# Patient Record
Sex: FEMALE | Race: WHITE | Employment: OTHER | URBAN - NONMETROPOLITAN AREA
[De-identification: names, ages, dates, MRNs, and addresses within clinical notes are randomized per-mention and may not be internally consistent; named-entity substitution may affect disease eponyms.]

---

## 2021-09-30 ENCOUNTER — HOSPITAL ENCOUNTER (OUTPATIENT)
Age: 36
Discharge: HOME | End: 2021-09-30
Payer: MEDICAID

## 2021-09-30 LAB
ALBUMIN SERPL-MCNC: 4.3 GM/DL (ref 3.4–5)
ALP BLD-CCNC: 48 IU/L (ref 40–129)
ALT SERPL-CCNC: 9 U/L (ref 10–40)
AMYLASE: 36 U/L (ref 25–115)
ANION GAP SERPL CALCULATED.3IONS-SCNC: 12 MMOL/L (ref 4–16)
AST SERPL-CCNC: 17 IU/L (ref 15–37)
BASOPHILS ABSOLUTE: 0.1 K/CU MM
BASOPHILS RELATIVE PERCENT: 0.6 % (ref 0–1)
BILIRUB SERPL-MCNC: 0.3 MG/DL (ref 0–1)
BUN BLDV-MCNC: 10 MG/DL (ref 6–23)
CALCIUM SERPL-MCNC: 9.2 MG/DL (ref 8.3–10.6)
CHLORIDE BLD-SCNC: 103 MMOL/L (ref 99–110)
CO2: 24 MMOL/L (ref 21–32)
CREAT SERPL-MCNC: 1 MG/DL (ref 0.6–1.1)
DIFFERENTIAL TYPE: ABNORMAL
EOSINOPHILS ABSOLUTE: 0.4 K/CU MM
EOSINOPHILS RELATIVE PERCENT: 3.2 % (ref 0–3)
GFR AFRICAN AMERICAN: >60 ML/MIN/1.73M2
GFR NON-AFRICAN AMERICAN: >60 ML/MIN/1.73M2
GLUCOSE BLD-MCNC: 86 MG/DL (ref 70–99)
HCT VFR BLD CALC: 38.3 % (ref 37–47)
HEMOGLOBIN: 12.6 GM/DL (ref 12.5–16)
IMMATURE NEUTROPHIL %: 0.4 % (ref 0–0.43)
LIPASE: 46 IU/L (ref 13–60)
LYMPHOCYTES ABSOLUTE: 3.6 K/CU MM
LYMPHOCYTES RELATIVE PERCENT: 26.5 % (ref 24–44)
MCH RBC QN AUTO: 29.4 PG (ref 27–31)
MCHC RBC AUTO-ENTMCNC: 32.9 % (ref 32–36)
MCV RBC AUTO: 89.3 FL (ref 78–100)
MONOCYTES ABSOLUTE: 0.9 K/CU MM
MONOCYTES RELATIVE PERCENT: 6.8 % (ref 0–4)
PDW BLD-RTO: 12.8 % (ref 11.7–14.9)
PLATELET # BLD: 311 K/CU MM (ref 140–440)
PMV BLD AUTO: 10.5 FL (ref 7.5–11.1)
POTASSIUM SERPL-SCNC: 3.9 MMOL/L (ref 3.5–5.1)
RBC # BLD: 4.29 M/CU MM (ref 4.2–5.4)
SEGMENTED NEUTROPHILS ABSOLUTE COUNT: 8.4 K/CU MM
SEGMENTED NEUTROPHILS RELATIVE PERCENT: 62.5 % (ref 36–66)
SODIUM BLD-SCNC: 139 MMOL/L (ref 135–145)
TOTAL IMMATURE NEUTOROPHIL: 0.05 K/CU MM
TOTAL PROTEIN: 7.3 GM/DL (ref 6.4–8.2)
WBC # BLD: 13.5 K/CU MM (ref 4–10.5)

## 2021-09-30 PROCEDURE — 85025 COMPLETE CBC W/AUTO DIFF WBC: CPT

## 2021-09-30 PROCEDURE — 83690 ASSAY OF LIPASE: CPT

## 2021-09-30 PROCEDURE — 80053 COMPREHEN METABOLIC PANEL: CPT

## 2021-09-30 PROCEDURE — 82150 ASSAY OF AMYLASE: CPT

## 2021-09-30 PROCEDURE — 36415 COLL VENOUS BLD VENIPUNCTURE: CPT

## 2021-11-16 ENCOUNTER — HOSPITAL ENCOUNTER (OUTPATIENT)
Dept: SLEEP CENTER | Age: 36
Discharge: HOME | End: 2021-11-16
Payer: MEDICAID

## 2021-11-16 VITALS
WEIGHT: 198 LBS | SYSTOLIC BLOOD PRESSURE: 131 MMHG | DIASTOLIC BLOOD PRESSURE: 84 MMHG | BODY MASS INDEX: 31.08 KG/M2 | HEIGHT: 67 IN

## 2021-11-16 PROCEDURE — 99211 OFF/OP EST MAY X REQ PHY/QHP: CPT

## 2021-11-16 RX ORDER — MONTELUKAST SODIUM 10 MG/1
TABLET ORAL
COMMUNITY
Start: 2021-11-07

## 2021-11-16 RX ORDER — PROPRANOLOL HYDROCHLORIDE 160 MG/1
CAPSULE, EXTENDED RELEASE ORAL
COMMUNITY
Start: 2021-11-13

## 2021-11-16 RX ORDER — ONDANSETRON 4 MG/1
TABLET, FILM COATED ORAL
COMMUNITY
Start: 2021-11-07

## 2021-11-16 ASSESSMENT — SLEEP AND FATIGUE QUESTIONNAIRES
HOW LIKELY ARE YOU TO NOD OFF OR FALL ASLEEP WHILE SITTING AND TALKING TO SOMEONE: 2
HOW LIKELY ARE YOU TO NOD OFF OR FALL ASLEEP WHILE SITTING QUIETLY AFTER LUNCH WITHOUT ALCOHOL: 2
ESS TOTAL SCORE: 17
HOW LIKELY ARE YOU TO NOD OFF OR FALL ASLEEP WHEN YOU ARE A PASSENGER IN A CAR FOR AN HOUR WITHOUT A BREAK: 3
HOW LIKELY ARE YOU TO NOD OFF OR FALL ASLEEP WHILE SITTING INACTIVE IN A PUBLIC PLACE: 2
HOW LIKELY ARE YOU TO NOD OFF OR FALL ASLEEP WHILE WATCHING TV: 2
HOW LIKELY ARE YOU TO NOD OFF OR FALL ASLEEP WHILE SITTING AND READING: 3
HOW LIKELY ARE YOU TO NOD OFF OR FALL ASLEEP IN A CAR, WHILE STOPPED FOR A FEW MINUTES IN TRAFFIC: 0
HOW LIKELY ARE YOU TO NOD OFF OR FALL ASLEEP WHILE LYING DOWN TO REST IN THE AFTERNOON WHEN CIRCUMSTANCES PERMIT: 3

## 2021-11-16 NOTE — PROGRESS NOTES
Leandra Upton MD, Rometta Lombard MD, Andrea Kim MD, Ken Dennison MD, [facility]      [address] [facility]. 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 female, referred to the sleep center for narcolepsy for six years. She was taking modafinil . She stopped taking modafinil but then stopped because of side effects. she had sleep study done 4-5 years ago which was negative. now she has difficulty sleeping during the night and can not stay awake during the day. Referring physician:  Timothy Galvan    History:    Social History     Socioeconomic History    Marital status: Single     Spouse name: Not on file    Number of children: Not on file    Years of education: Not on file    Highest education level: Not on file   Occupational History    Not on file   Tobacco Use    Smoking status: Not on file    Smokeless tobacco: Not on file   Substance and Sexual Activity    Alcohol use: Not on file    Drug use: Not on file    Sexual activity: Not on file   Other Topics Concern    Not on file   Social History Narrative    Not on file     Social Determinants of Health     Financial Resource Strain:     Difficulty of Paying Living Expenses: Not on file   Food Insecurity:     Worried About Running Out of Food in the Last Year: Not on file    [place] of Food in the Last Year: Not on file   Transportation Needs:     Lack of Transportation (Medical): Not on file    Lack of Transportation (Non-Medical):  Not on file   Physical Activity:     Days of Exercise per Week: Not on file    Minutes of Exercise per Session: Not on file   Stress:     Feeling of Stress : Not on file   Social Connections:     Frequency of Communication with Friends and Family: Not on file    Frequency of Social Gatherings with Friends and Family: Not on file    Attends [employer] Services: Not on file    Active Member of Clubs or Organizations: Not on file    Attends Club or Organization Meetings: Not on file    Marital Status: Not on file   Intimate Partner Violence:     Fear of Current or Ex-Partner: Not on file    Emotionally Abused: Not on file    Physically Abused: Not on file    Sexually Abused: Not on file   Housing Stability:     Unable to Pay for Housing in the Last Year: Not on file    Number of [place] in the Last Year: Not on file    Unstable Housing in the Last Year: Not on file       Prior to Admission medications    Not on File       Allergies as of 11/16/2021    (Not on File)       Objective:   Heart Rate: 72bpm  SPO2: 99%  Blood Pressure: 131/84  Height: 67\"  Weight: 198 lbs  Neck: 14.5 Inches  Powell - 17    Gen: No distress. Eyes: PERRL. No sclera icterus. No conjunctival injection. ENT: No discharge. Pharynx clear. External appearance of ears and nose normal.  Neck: Trachea midline. No obvious mass. Resp: No accessory muscle use. No crackles. No wheezes. No rhonchi. No dullness on percussion. CV: Regular rate. Regular rhythm. No murmur or rub. No edema. GI: Non-tender. Non-distended. No hernia. Skin: Warm, dry, normal texture and turgor. No nodule on exposed extremities. Lymph: No cervical LAD. No supraclavicular LAD. M/S: No cyanosis. No clubbing. No joint deformity. Psych: Oriented x 3. No anxiety. Awake. Alert. Intact judgement and insight.     Mallampati Airway Classification:   []1 [x]2 []3 []4        Sleep Complaints/Symptoms:    Normal Bedtime: 10:30PM     Normal Wake Time: 11:30AM  Average Sleep Time: 11hours     Number of Awakenings: 2-3    Duration of Sleep Complaints: 10 years    [] Snoring     [] Improved [] Not Improved    [] Choking/Gasping for Breath  [] Improved [] Not Improved       [] Witnessed Apneas              [] Improved [] Not Improved  [x] Daytime Sleepiness             [] Improved [] Not Improved  [] Morning Headaches    [] Improved [] Not Improved  [] Frequent Awakenings       [] Improved [] Not Improved  [] Jerky Movements   [] Improved [] Not Improved   [] Restless Legs   [] Improved [] Not Improved   [] Difficulty Initiating Sleep  [] Improved [] Not Improved   [] Difficulty Maintaining Sleep  [] Improved [] Not Improved   [] Restless Sleep    [] Improved [] Not Improved   [] Sleep Paralysis    [] Improved [] Not Improved   [] Muscle Weakness w/ Emotion  [] Improved [] Not Improved  [] Other :     CPAP Usage:    [x]  Patient has never worn CPAP  []  Patient has worn CPAP previously but discontinued use  []  Current PAP user,  [years]   []  Patient Tolerates Well   []  Patient Does Not Tolerate     []  Patient Uses CPAP      []  More Than 4 Hours      []  Less Than 4 Hours  []  CPAP/BPAP/ASV Pressure Readings   []  CPAP Pressure      cm H20   []  BPAP Pressure       cm H20   []  ASV Pressure         cm H20      Assessment:      Diagnosis:  narcolepsy       Plan:        Sleep Study:     []  Sleep hygiene/ relaxation methods & CBTi principles review with patient     []  HST - Home Sleep Study   []  PSG - Overnight Diagnostic Polysomnogram     []  CPAP Titration    [] Split Night Study    [] BiLevel Titration    [] ASV - Auto-Servo Ventilation Titration       []  MSLT - Multiple Sleep Latency Test   []  MWT - Maintenance of Wakefulness Test    CPAP Therapy:     []  Patient to be seen for new mask fitting/desensitization   []  AutoPAP Titration    []  CPAP supplies and equipment at ________cmH2O    []  Continue same CPAP pressure   []  Change CPAP pressure to _______cm H2O   []  CPAP supplies only, no pressure change   []  Refer for an oral appliance       Medications:       [x]  Continue current medication    []  Add Medication:  ________________    Follow-Up:     []  No follow up required. Patient to return as needed.     []  2 weeks   []  4 weeks   []  2 months   []  4 months   [] 6 months   []  1 year for CPAP compliance evaluation. Patient to return sooner, as needed. []  Follow up after sleep study   [x]  Other: _I will send her to a neurologist as has narcolepsy and having problem with modafinil_    No orders of the defined types were placed in this encounter.          Electronically signed by Danielle Reveles MD on 11/16/2021 at 11:54 AM

## 2022-08-17 ENCOUNTER — HOSPITAL ENCOUNTER (EMERGENCY)
Age: 37
Discharge: HOME | End: 2022-08-17
Attending: STUDENT IN AN ORGANIZED HEALTH CARE EDUCATION/TRAINING PROGRAM
Payer: MEDICAID

## 2022-08-17 VITALS
HEART RATE: 67 BPM | OXYGEN SATURATION: 97 % | RESPIRATION RATE: 18 BRPM | SYSTOLIC BLOOD PRESSURE: 138 MMHG | TEMPERATURE: 98.4 F | DIASTOLIC BLOOD PRESSURE: 95 MMHG

## 2022-08-17 DIAGNOSIS — H54.7 VISION LOSS: Primary | ICD-10-CM

## 2022-08-17 LAB — HCG(URINE) PREGNANCY TEST: NEGATIVE

## 2022-08-17 PROCEDURE — 2580000003 HC RX 258: Performed by: STUDENT IN AN ORGANIZED HEALTH CARE EDUCATION/TRAINING PROGRAM

## 2022-08-17 PROCEDURE — 99285 EMERGENCY DEPT VISIT HI MDM: CPT

## 2022-08-17 PROCEDURE — 84703 CHORIONIC GONADOTROPIN ASSAY: CPT

## 2022-08-17 PROCEDURE — 96374 THER/PROPH/DIAG INJ IV PUSH: CPT

## 2022-08-17 PROCEDURE — 6360000002 HC RX W HCPCS: Performed by: STUDENT IN AN ORGANIZED HEALTH CARE EDUCATION/TRAINING PROGRAM

## 2022-08-17 RX ORDER — PROCHLORPERAZINE EDISYLATE 5 MG/ML
10 INJECTION INTRAMUSCULAR; INTRAVENOUS ONCE
Status: COMPLETED | OUTPATIENT
Start: 2022-08-17 | End: 2022-08-17

## 2022-08-17 RX ORDER — 0.9 % SODIUM CHLORIDE 0.9 %
1000 INTRAVENOUS SOLUTION INTRAVENOUS ONCE
Status: COMPLETED | OUTPATIENT
Start: 2022-08-17 | End: 2022-08-17

## 2022-08-17 RX ADMIN — SODIUM CHLORIDE 1000 ML: 0.9 INJECTION, SOLUTION INTRAVENOUS at 17:46

## 2022-08-17 RX ADMIN — PROCHLORPERAZINE EDISYLATE 10 MG: 5 INJECTION, SOLUTION INTRAMUSCULAR; INTRAVENOUS at 17:45

## 2022-08-17 ASSESSMENT — PAIN - FUNCTIONAL ASSESSMENT: PAIN_FUNCTIONAL_ASSESSMENT: NONE - DENIES PAIN

## 2022-08-17 NOTE — ED PROVIDER NOTES
melena, hematochezia    -Genitourinary: hematuria, dysuria, urinary frequency    -Neurological: numbness or weakness    -Skin:   Rash, pruritis,     -Hematologic: easy bleeding, easy bruising    -Musculoskeletal:  joint swelling, joint redness    Past Medical, Surgical, Family, and Social History     She has a past medical history of Asthma, Cortical visual impairment, Dysautonomia ([facility] [address] EDS (Chandu-Danlos syndrome), Gastroparesis, Headache, Mast cell disorder, and TBI (traumatic brain injury) ([facility] [address] She has a past surgical history that includes Tonsillectomy. Her family history is not on file. She reports current alcohol use. She reports that she does not currently use drugs. Medications     Previous Medications    MONTELUKAST (SINGULAIR) 10 MG TABLET        ONDANSETRON (ZOFRAN) 4 MG TABLET        PROPRANOLOL (INDERAL LA) 160 MG EXTENDED RELEASE CAPSULE           Allergies     She is allergic to buspar [buspirone], dilaudid [hydromorphone], neosporin [bacitracin-polymyxin b], and sulfa antibiotics. Physical Exam     INITIAL VITALS: BP: (!) 150/92, Temp: 98.4 °F (36.9 °C), Heart Rate: 57, Resp: 17,       General:  Well appearing. No acute distress. Non-toxic appearing    Eyes:  Pupils equally round, reactive, brisk. No discharge from eyes. No papilledema b/l  ENT:  No discharge from nose. OP clear. Neck:  Supple. Nontender. Pulmonary:   Non-labored breathing. Breath sounds clear bilaterally. Cardiac:  Regular rate and rhythm. No murmurs. Abdomen:  Soft. Non-tender. Non-distended. No masses. Musculoskeletal:  No long bone deformity. No ankle or wrist deformity. Vascular:  Extremities warm and perfused. Skin:  No rash. Warm. Neuro:  Mental Status: GCS 15. AAOx4. Speech is clear and fluent, with no aphasia or dysarthria. CN: Visual fields are full to confrontation. Pupils are 4->2 mm bilaterally, round and briskly reactive to light. EOMI with no nystagmus.  Facial sensation is intact in all 3 divisions bilaterally. Face is symmetric with normal eye closure and smile. Hearing is grossly intact bilaterally. Palate elevates symmetrically. Phonation is normal. Shoulder shrug is intact bilaterally. Tongue is midline with normal movement and no atrophy. Motor:   Shoulder Abduction Arm Flexion Arm Extension Wrist Extension Finger Abduction    Left 5 5 5 5 5 5   Right 5 5 5 5 5 5    Hip Flexion Hip Extension Knee Flexion Knee Extension Plantar Flexion Dorsiflexion   Left 5 5 5 5 5 5   Right 5 5 5 5 5 5   Sensation: Intact to fine touch in bilateral upper and lower extremities. Reflexes: 2+ and symmetric in bilateral  knees. Coordination: Finger-to-nose normal bilaterally. Gait/Balance/Proprioception: Gait normal without ataxia. Normal Romberg test. No pronator drift. Extremities:  No peripheral edema. LE symmetric. Diagnostic Results     EKG   None indicated    RADIOLOGY:  No orders to display       LABS:   Results for orders placed or performed during the hospital encounter of 08/17/22   hCG, qualitative, pregnancy (Lab)   Result Value Ref Range    HCG(Urine) Pregnancy Test Negative Detects HCG level >20 MIU/mL       ED BEDSIDE ULTRASOUND:  None performed    Procedures     None performed    ED Course     Nursing Notes, Past Medical Hx, Past Surgical Hx, Social Hx, Allergies, and Family Hx were reviewed. The patient was given the following medications:  Orders Placed This Encounter   Medications    prochlorperazine (COMPAZINE) injection 10 mg    0.9 % sodium chloride bolus       CONSULTS:  None    MEDICAL DECISIONMAKING / ASSESSMENT / Annettesymone Jewell is a 40 y.o. female with vision loss. Pt was hemodynamically stable and afebrile in the Emergency Department. Patient presents with visual symptoms. The differential would include retinal detachment. Given her history of optic neuritis, I do consider optic neuritis to also be a possibility.   I considered but had a lower suspicion for IIH, although I note that she does have a risk factor of obesity. I counseled the patient about my diagnostic uncertainty, and the fact that my differential was led by retinal detachment. We do not have ophthalmology on-call here, so I consulted the transfer center of [facility], and Dr. Eliezer Ayers of [facility] accepted the patient in transfer to [facility]. Dr. Adam Leslie kindly excepted the patient to the ED to facilitate evaluation by the on-call ophthalmologist.  If the patient does have a retinal detachment she would be indicated for emergent laser treatment. I did not feel that delay for other diagnostics here was indicated. Moreover I counseled her that should she not have a retinal detachment she should ensure that she has appropriate evaluation for these alternative diagnoses. The patient does not have any signs or symptoms to suggest a posterior circulation stroke as the etiology of her vision changes, although her history of cortical visual impairment does raise possibility that this may be some type of recrudescence or other complication of her prior TBI. She does wear contact lenses, but given the prominence of the visual symptoms in the absence of pain and lack of a red eye, had a low suspicion for ophthalmologic infection at this time. The patient is stable for transfer by private vehicle. While arranging transport she did develop a mild headache, which I treated with Compazine and IV fluids. After transfer was arranged, the IV was removed and the patient was transferred by private vehicle  I did indicate how important that she was follow-up tonight       Clinical Impression     1. Vision loss         Disposition     PATIENT REFERRED TO:  No follow-up provider specified.     DISCHARGE MEDICATIONS:  New Prescriptions    No medications on file       DISPOSITION Decision To Transfer 08/17/2022 05:26:47 PM         Janette Almanzar MD  08/17/22 [id number]

## 2022-08-17 NOTE — ED NOTES
Report given to Vidal Akhtar RN at [facility] ED.  Pt to arrive via private transport     Martha Kraft, [place]  08/17/22 9543

## 2023-10-15 ENCOUNTER — HOSPITAL ENCOUNTER (EMERGENCY)
Age: 38
Discharge: HOME | End: 2023-10-15
Attending: STUDENT IN AN ORGANIZED HEALTH CARE EDUCATION/TRAINING PROGRAM
Payer: MEDICAID

## 2023-10-15 VITALS
DIASTOLIC BLOOD PRESSURE: 75 MMHG | WEIGHT: 216.5 LBS | HEART RATE: 50 BPM | SYSTOLIC BLOOD PRESSURE: 136 MMHG | RESPIRATION RATE: 17 BRPM | BODY MASS INDEX: 33.91 KG/M2 | OXYGEN SATURATION: 100 % | TEMPERATURE: 98.2 F

## 2023-10-15 DIAGNOSIS — R11.0 NAUSEA: ICD-10-CM

## 2023-10-15 DIAGNOSIS — K62.5 RECTAL BLEEDING: Primary | ICD-10-CM

## 2023-10-15 DIAGNOSIS — N39.0 URINARY TRACT INFECTION WITHOUT HEMATURIA, SITE UNSPECIFIED: ICD-10-CM

## 2023-10-15 LAB
ALBUMIN SERPL-MCNC: 4.7 G/DL (ref 3.4–5)
ALP SERPL-CCNC: 72 U/L (ref 40–129)
ALT SERPL-CCNC: 8 U/L (ref 10–40)
ANION GAP SERPL CALCULATED.3IONS-SCNC: 13 MMOL/L (ref 3–16)
AST SERPL-CCNC: 17 U/L (ref 15–37)
BACTERIA URNS QL MICRO: ABNORMAL /HPF
BASOPHILS # BLD: 0.1 K/UL (ref 0–0.2)
BASOPHILS NFR BLD: 0.6 %
BILIRUB DIRECT SERPL-MCNC: <0.2 MG/DL (ref 0–0.3)
BILIRUB INDIRECT SERPL-MCNC: ABNORMAL MG/DL (ref 0–1)
BILIRUB SERPL-MCNC: 0.4 MG/DL (ref 0–1)
BILIRUB UR QL STRIP.AUTO: NEGATIVE
BUN SERPL-MCNC: 12 MG/DL (ref 7–20)
CALCIUM SERPL-MCNC: 9.5 MG/DL (ref 8.3–10.6)
CHLORIDE SERPL-SCNC: 104 MMOL/L (ref 99–110)
CLARITY UR: CLEAR
CO2 SERPL-SCNC: 23 MMOL/L (ref 21–32)
COLOR UR: YELLOW
CREAT SERPL-MCNC: 0.9 MG/DL (ref 0.6–1.1)
DEPRECATED RDW RBC AUTO: 13.4 % (ref 12.4–15.4)
EOSINOPHIL # BLD: 0.1 K/UL (ref 0–0.6)
EOSINOPHIL NFR BLD: 0.7 %
EPI CELLS #/AREA URNS HPF: ABNORMAL /HPF (ref 0–5)
GFR SERPLBLD CREATININE-BSD FMLA CKD-EPI: >60 ML/MIN/{1.73_M2}
GLUCOSE SERPL-MCNC: 99 MG/DL (ref 70–99)
GLUCOSE UR STRIP.AUTO-MCNC: NEGATIVE MG/DL
HCG SERPL QL: NEGATIVE
HCT VFR BLD AUTO: 39.1 % (ref 36–48)
HGB BLD-MCNC: 13.3 G/DL (ref 12–16)
HGB UR QL STRIP.AUTO: NEGATIVE
KETONES UR STRIP.AUTO-MCNC: 15 MG/DL
LEUKOCYTE ESTERASE UR QL STRIP.AUTO: ABNORMAL
LIPASE SERPL-CCNC: 31 U/L (ref 13–60)
LYMPHOCYTES # BLD: 2 K/UL (ref 1–5.1)
LYMPHOCYTES NFR BLD: 16.2 %
MCH RBC QN AUTO: 29.5 PG (ref 26–34)
MCHC RBC AUTO-ENTMCNC: 34 G/DL (ref 31–36)
MCV RBC AUTO: 86.7 FL (ref 80–100)
MONOCYTES # BLD: 0.6 K/UL (ref 0–1.3)
MONOCYTES NFR BLD: 4.9 %
MUCOUS THREADS #/AREA URNS LPF: ABNORMAL /LPF
NEUTROPHILS # BLD: 9.7 K/UL (ref 1.7–7.7)
NEUTROPHILS NFR BLD: 77.6 %
NITRITE UR QL STRIP.AUTO: NEGATIVE
PH UR STRIP.AUTO: 6 [PH] (ref 5–8)
PLATELET # BLD AUTO: 301 K/UL (ref 135–450)
PMV BLD AUTO: 9 FL (ref 5–10.5)
POTASSIUM SERPL-SCNC: 4.1 MMOL/L (ref 3.5–5.1)
PROT SERPL-MCNC: 8.1 G/DL (ref 6.4–8.2)
PROT UR STRIP.AUTO-MCNC: NEGATIVE MG/DL
RBC # BLD AUTO: 4.51 M/UL (ref 4–5.2)
RBC #/AREA URNS HPF: ABNORMAL /HPF (ref 0–4)
SODIUM SERPL-SCNC: 140 MMOL/L (ref 136–145)
SP GR UR STRIP.AUTO: 1.01 (ref 1–1.03)
UA COMPLETE W REFLEX CULTURE PNL UR: ABNORMAL
UA DIPSTICK W REFLEX MICRO PNL UR: YES
URN SPEC COLLECT METH UR: ABNORMAL
UROBILINOGEN UR STRIP-ACNC: 0.2 E.U./DL
WBC # BLD AUTO: 12.5 K/UL (ref 4–11)
WBC #/AREA URNS HPF: ABNORMAL /HPF (ref 0–5)

## 2023-10-15 PROCEDURE — 84703 CHORIONIC GONADOTROPIN ASSAY: CPT

## 2023-10-15 PROCEDURE — 87086 URINE CULTURE/COLONY COUNT: CPT

## 2023-10-15 PROCEDURE — 36415 COLL VENOUS BLD VENIPUNCTURE: CPT

## 2023-10-15 PROCEDURE — 99284 EMERGENCY DEPT VISIT MOD MDM: CPT

## 2023-10-15 PROCEDURE — 2580000003 HC RX 258: Performed by: PHYSICIAN ASSISTANT

## 2023-10-15 PROCEDURE — 81001 URINALYSIS AUTO W/SCOPE: CPT

## 2023-10-15 PROCEDURE — 6360000002 HC RX W HCPCS: Performed by: PHYSICIAN ASSISTANT

## 2023-10-15 PROCEDURE — 80076 HEPATIC FUNCTION PANEL: CPT

## 2023-10-15 PROCEDURE — 96374 THER/PROPH/DIAG INJ IV PUSH: CPT

## 2023-10-15 PROCEDURE — 85025 COMPLETE CBC W/AUTO DIFF WBC: CPT

## 2023-10-15 PROCEDURE — 83690 ASSAY OF LIPASE: CPT

## 2023-10-15 PROCEDURE — 80048 BASIC METABOLIC PNL TOTAL CA: CPT

## 2023-10-15 PROCEDURE — 6370000000 HC RX 637 (ALT 250 FOR IP): Performed by: PHYSICIAN ASSISTANT

## 2023-10-15 RX ORDER — CEPHALEXIN 500 MG/1
500 CAPSULE ORAL 4 TIMES DAILY
Qty: 20 CAPSULE | Refills: 0 | Status: SHIPPED | OUTPATIENT
Start: 2023-10-15 | End: 2023-10-20

## 2023-10-15 RX ORDER — 0.9 % SODIUM CHLORIDE 0.9 %
1000 INTRAVENOUS SOLUTION INTRAVENOUS ONCE
Status: COMPLETED | OUTPATIENT
Start: 2023-10-15 | End: 2023-10-15

## 2023-10-15 RX ORDER — METOCLOPRAMIDE 10 MG/1
10 TABLET ORAL
COMMUNITY

## 2023-10-15 RX ORDER — CEPHALEXIN 500 MG/1
500 CAPSULE ORAL ONCE
Status: COMPLETED | OUTPATIENT
Start: 2023-10-15 | End: 2023-10-15

## 2023-10-15 RX ORDER — PROPRANOLOL HYDROCHLORIDE 120 MG/1
CAPSULE, EXTENDED RELEASE ORAL
COMMUNITY
Start: 2023-10-06

## 2023-10-15 RX ORDER — ONDANSETRON 2 MG/ML
4 INJECTION INTRAMUSCULAR; INTRAVENOUS ONCE
Status: COMPLETED | OUTPATIENT
Start: 2023-10-15 | End: 2023-10-15

## 2023-10-15 RX ORDER — FAMOTIDINE 40 MG/1
TABLET, FILM COATED ORAL
COMMUNITY
Start: 2023-09-17

## 2023-10-15 RX ORDER — CETIRIZINE HYDROCHLORIDE 10 MG/1
10 TABLET ORAL NIGHTLY
COMMUNITY

## 2023-10-15 RX ORDER — PROMETHAZINE HYDROCHLORIDE 25 MG/1
25 TABLET ORAL 3 TIMES DAILY
Qty: 20 TABLET | Refills: 0 | Status: SHIPPED | OUTPATIENT
Start: 2023-10-15 | End: 2023-10-22

## 2023-10-15 RX ORDER — CEPHALEXIN 500 MG/1
500 CAPSULE ORAL 4 TIMES DAILY
Qty: 20 CAPSULE | Refills: 0 | Status: SHIPPED | OUTPATIENT
Start: 2023-10-15 | End: 2023-10-15 | Stop reason: SDUPTHER

## 2023-10-15 RX ORDER — DICYCLOMINE HCL 20 MG
20 TABLET ORAL
Status: DISCONTINUED | OUTPATIENT
Start: 2023-10-15 | End: 2023-10-15 | Stop reason: HOSPADM

## 2023-10-15 RX ORDER — PROMETHAZINE HYDROCHLORIDE 25 MG/1
25 TABLET ORAL ONCE
Status: COMPLETED | OUTPATIENT
Start: 2023-10-15 | End: 2023-10-15

## 2023-10-15 RX ORDER — MELOXICAM 7.5 MG/1
TABLET ORAL
COMMUNITY
Start: 2023-09-20

## 2023-10-15 RX ORDER — METHOCARBAMOL 750 MG/1
TABLET, FILM COATED ORAL
COMMUNITY
Start: 2023-09-25

## 2023-10-15 RX ADMIN — ONDANSETRON 4 MG: 2 INJECTION INTRAMUSCULAR; INTRAVENOUS at 15:48

## 2023-10-15 RX ADMIN — DICYCLOMINE HYDROCHLORIDE 20 MG: 20 TABLET ORAL at 15:48

## 2023-10-15 RX ADMIN — CEPHALEXIN 500 MG: 500 CAPSULE ORAL at 17:23

## 2023-10-15 RX ADMIN — PROMETHAZINE HYDROCHLORIDE 25 MG: 25 TABLET ORAL at 17:23

## 2023-10-15 RX ADMIN — SODIUM CHLORIDE 1000 ML: 9 INJECTION, SOLUTION INTRAVENOUS at 15:52

## 2023-10-15 ASSESSMENT — ENCOUNTER SYMPTOMS
BLOOD IN STOOL: 1
DIARRHEA: 1
COUGH: 0
VOMITING: 1
SHORTNESS OF BREATH: 0
NAUSEA: 1
ABDOMINAL PAIN: 1

## 2023-10-15 ASSESSMENT — PAIN SCALES - GENERAL: PAINLEVEL_OUTOF10: 6

## 2023-10-15 NOTE — ED PROVIDER NOTES
ED Attending Attestation Note     Date of evaluation: 10/15/2023    I have discussed the case with the NATALY. I have personally performed a history, physical exam, and my own medical decision making. I have reviewed the note and agree with the findings and plan. My assessment reveals a well-appearing 80-year-old female with past medical history of gastroparesis, Chandu-Danlos, and asthma presenting with abdominal pain, diarrhea, and blood in her stool. Her work-up is reassuring despite a mild leukocytosis. Given her mother passed away of cecal cancer and her newly bloody stool, the patient has been given referral to GI for colonoscopy. She is endorsing mild dysuria and has a UTI on urinalysis. Patient given prescription for Keflex on discharge for UTI.          Roseanna Del Angel MD  10/15/23 [id number]

## 2023-10-15 NOTE — ED PROVIDER NOTES
[address] [facility] ENCOUNTER          PHYSICIAN ASSISTANT NOTE       Date of evaluation: 10/15/2023    Chief Complaint     Rectal Bleeding (Pt reports that last night she began having blood in her stool with diarrhea and low abd cramps. No improvement this morning and vomiting started this morning. Diarrhea ongoing x 2 weeks r/t medication change and increase in anxiety with medication changes )      History of Present Illness     Elaine Ibarra is a 45 y.o. female with a past medical history of asthma, Chandu-Danlos, gastroparesis, mast cell disorder, narcolepsy, TBI who is here today complaining of generalized abdominal cramping, nausea and vomiting, intermittent headaches, bloody stool, ongoing over the past 1 to 2 days. She has been taking Zofran at home with only mild relief. She has not been taking any other medication for nausea or pain. She also endorses some decreased oral intake over this time. She denies urinary symptoms, vaginal symptoms, known pregnancy, fevers, cough, pain elsewhere. Review of Systems     Review of Systems   Constitutional:  Negative for chills and fever. Respiratory:  Negative for cough and shortness of breath. Cardiovascular:  Negative for chest pain. Gastrointestinal:  Positive for abdominal pain, blood in stool, diarrhea, nausea and vomiting. Genitourinary:  Negative for dysuria. Neurological:  Positive for headaches. Past Medical, Surgical, Family, and Social History     She has a past medical history of Asthma, Cortical visual impairment, Dysautonomia ([address]), EDS (Chandu-Danlos syndrome), Gastroparesis, Headache, Mast cell disorder, Narcolepsy, Obsessive compulsive disorder, and TBI (traumatic brain injury) ([address] She has a past surgical history that includes Tonsillectomy. Her family history is not on file. She reports that she has never smoked. She does not have any smokeless tobacco history on file.  She reports current

## 2023-10-15 NOTE — DISCHARGE INSTRUCTIONS
As we discussed, take Keflex as directed until course is complete for UTI. Take Phenergan as directed as needed for nausea. Discontinue taking Zofran as you should not take 2 antiemetics at the same time. Follow-up with your primary care provider. Return to the emergency department with continued blood in your stool, lightheadedness or dizziness, chest pain or shortness of breath, inability to eat or drink, worsening abdominal pain or other concerning symptoms.

## 2023-10-17 LAB — BACTERIA UR CULT: NORMAL

## 2024-04-05 ENCOUNTER — HOSPITAL ENCOUNTER (EMERGENCY)
Age: 39
Discharge: HOME | End: 2024-04-05
Attending: EMERGENCY MEDICINE
Payer: MEDICAID

## 2024-04-05 VITALS
SYSTOLIC BLOOD PRESSURE: 140 MMHG | BODY MASS INDEX: 29.37 KG/M2 | WEIGHT: 187.5 LBS | DIASTOLIC BLOOD PRESSURE: 97 MMHG | HEART RATE: 75 BPM | TEMPERATURE: 98.3 F | OXYGEN SATURATION: 97 % | RESPIRATION RATE: 16 BRPM

## 2024-04-05 DIAGNOSIS — T15.02XA FOREIGN BODY IN CORNEA, LEFT EYE, INITIAL ENCOUNTER: Primary | ICD-10-CM

## 2024-04-05 PROCEDURE — 99283 EMERGENCY DEPT VISIT LOW MDM: CPT

## 2024-04-05 PROCEDURE — 65220 REMOVE FOREIGN BODY FROM EYE: CPT

## 2024-04-05 PROCEDURE — 6370000000 HC RX 637 (ALT 250 FOR IP): Performed by: EMERGENCY MEDICINE

## 2024-04-05 RX ORDER — TETRACAINE HYDROCHLORIDE 5 MG/ML
1 SOLUTION OPHTHALMIC ONCE
Status: COMPLETED | OUTPATIENT
Start: 2024-04-05 | End: 2024-04-05

## 2024-04-05 RX ORDER — OFLOXACIN 3 MG/ML
1 SOLUTION/ DROPS OPHTHALMIC 4 TIMES DAILY
Qty: 1 EACH | Refills: 0 | Status: SHIPPED | OUTPATIENT
Start: 2024-04-05 | End: 2024-04-10

## 2024-04-05 RX ADMIN — TETRACAINE HYDROCHLORIDE 1 DROP: 5 SOLUTION OPHTHALMIC at 15:38

## 2024-04-05 RX ADMIN — FLUORESCEIN SODIUM 1 MG: 1 STRIP OPHTHALMIC at 15:38

## 2024-04-05 ASSESSMENT — LIFESTYLE VARIABLES
HOW OFTEN DO YOU HAVE A DRINK CONTAINING ALCOHOL: NEVER
HOW MANY STANDARD DRINKS CONTAINING ALCOHOL DO YOU HAVE ON A TYPICAL DAY: PATIENT DOES NOT DRINK

## 2024-04-05 ASSESSMENT — ENCOUNTER SYMPTOMS
EYE PAIN: 0
PHOTOPHOBIA: 0
EYE REDNESS: 0

## 2024-04-05 ASSESSMENT — PAIN SCALES - GENERAL: PAINLEVEL_OUTOF10: 1

## 2024-04-05 ASSESSMENT — PAIN DESCRIPTION - LOCATION: LOCATION: EYE

## 2024-04-05 ASSESSMENT — PAIN DESCRIPTION - DESCRIPTORS: DESCRIPTORS: DISCOMFORT

## 2024-04-05 ASSESSMENT — PAIN DESCRIPTION - ORIENTATION: ORIENTATION: LEFT

## 2024-04-05 ASSESSMENT — PAIN - FUNCTIONAL ASSESSMENT: PAIN_FUNCTIONAL_ASSESSMENT: 0-10

## 2024-04-05 NOTE — ED PROVIDER NOTES
[facility]  EMERGENCY DEPARTMENT ENCOUNTER          ATTENDING PHYSICIAN NOTE       Date of evaluation: 4/5/2024    Chief Complaint     Foreign Body in Eye (Pt reports that she tore a contact and its stuck in her left eye.)      History of Present Illness     Alexia Ball is a 38 y.o. female who presents contact lens which was stuck in her left eye.  She got half of it out but the other half is under her upper eyelid.  This has been on for the last few hours.  She denies any pain any visual disturbance.  She has a history of cortical optic problems from a traumatic brain injury.  She states her vision is baseline.  She has had no redness tearing    ASSESSMENT / PLAN  (MEDICAL DECISION MAKING)     INITIAL VITALS: BP: (!) 140/97, Temp: 98.3 °F (36.8 °C), Pulse: 75, Respirations: 16, SpO2: 97 %      Alexia Ball is a 38 y.o. female    Medical Decision Making  This is a 38-year-old female who got half of her contact lens stuck in her left thigh.  It has been there for couple hours.  I did remove this with a contact lens remover.  There is a small abrasion where the contact lens was.  There is no evidence of corneal ulceration no photophobia no pain.  Will place her on Ocuflox for 5 days for prophylaxis follow-up with her ophthalmologist return for any worsening symptoms    Risk  Prescription drug management.            Clinical Impression     1. Foreign body in cornea, left eye, initial encounter        Disposition     PATIENT REFERRED TO:  Optometrist or ophthalmologist    Go to   If symptoms worsen      DISCHARGE MEDICATIONS:  New Prescriptions    OFLOXACIN (OCUFLOX) 0.3 % SOLUTION    Place 1 drop into the left eye 4 times daily for 5 days       DISPOSITION Decision To Discharge 04/05/2024 03:42:44 PM        Diagnostic Results and Other Data       RADIOLOGY:  No orders to display       LABS:   No results found for this visit on 04/05/24.  EKG       ED BEDSIDE ULTRASOUND:  No results found.    MOST

## 2024-04-05 NOTE — ED NOTES
Patient prepared for and ready to be discharged. Patient discharged at this time in no acute distress after verbalizing understanding of discharge instructions. Patient left after receiving After Visit Summary instructions.      Ashish Duran RN  04/05/24 [id number]